# Patient Record
Sex: MALE | Race: WHITE | Employment: OTHER | URBAN - METROPOLITAN AREA
[De-identification: names, ages, dates, MRNs, and addresses within clinical notes are randomized per-mention and may not be internally consistent; named-entity substitution may affect disease eponyms.]

---

## 2017-01-16 ENCOUNTER — ALLSCRIPTS OFFICE VISIT (OUTPATIENT)
Dept: OTHER | Facility: OTHER | Age: 72
End: 2017-01-16

## 2018-01-12 VITALS
DIASTOLIC BLOOD PRESSURE: 92 MMHG | WEIGHT: 177 LBS | SYSTOLIC BLOOD PRESSURE: 148 MMHG | HEART RATE: 88 BPM | RESPIRATION RATE: 16 BRPM | HEIGHT: 66 IN | BODY MASS INDEX: 28.45 KG/M2

## 2018-01-17 NOTE — PROGRESS NOTES
Chief Complaint  1  Placement of a thoracic spinal cord stimulator paddle electrode via T10-11 laminectomy  2  Placement of a left buttock implantable pulse generator and  3  Electronic analysis complex programming spinal cord stimulator system postoperative period approximately 1 hour on 11/29/16 by Dr Yasmin Contreras  History of Present Illness  HPI: The patient presents today for his two week incision check and staple removal accompanied by his wife  He states he is still having low back pain which is the same as before surgery  He is unsure how to use his program and states he is slightly confused  Iliana malin [address]  was available and offered the patient assistance  [facility] Practices Required Assessment:   Pain Assessment   the patient states they do not have pain  Abuse And Domestic Violence Screen    Yes, the patient is safe at home  The patient states no one is hurting them  Depression And Suicide Screen  No, the patient has not had thoughts of hurting themself  No, the patient has not felt depressed in the past 7 days  Readiness To Learn: Receptive  Barriers To Learning: none  Preferred Learning: verbal   Education Completed: medications, further treatment/follow-up and treatment/procedure   Teaching Method: verbal   Person Taught: patient   Evaluation Of Learning: verbalized/demonstrated understanding      Active Problems    1  Chronic pain syndrome (338 4) (G89 4)   2  Lumbar degenerative disc disease (722 52) (M51 36)   3  Lumbar stenosis with neurogenic claudication (724 03) (M48 06)   4  Post laminectomy syndrome (722 80) (M96 1)   5  Status post insertion of spinal cord stimulator (V45 89) (Z98 890)    Current Meds   1  Adult Aspirin EC Low Strength 81 MG Oral Tablet Delayed Release; TAKE 1 TABLET   DAILY; Therapy: (Recorded:[id number]) to Recorded   2  ALPRAZolam 0 25 MG Oral Tablet; TAKE 1 TABLET DAILY AS NEEDED; Therapy: (Barbara Sierra) to Recorded   3  Atorvastatin Calcium 10 MG Oral Tablet; TAKE 1 TABLET AT BEDTIME; Therapy: (Maura Hendricks) to Recorded   4  Benefiber Drink Mix POWD; MIX PACKET Every morning; Therapy: (Maura Hendricks) to Recorded   5  Celecoxib 200 MG Oral Capsule; TAKE 1 CAPSULE DAILY; Therapy: (Recorded:[id number]) to Recorded   6  Cholecalciferol 5000 UNIT TABS; Take 1 tablet daily; Therapy: (Maura Hendricks) to Recorded   7  Cyanocobalamin 1000 MCG TABS; TAKE 1 TABLET DAILY AS DIRECTED; Therapy: (Maura Hendricks) to Recorded   8  HydroCHLOROthiazide 50 MG Oral Tablet; take 1 tablet daily in the morning; Therapy: (Maura Hendricks) to Recorded   9  Hydrocodone-Acetaminophen  MG Oral Tablet; TAKE 1 TABLET EVERY 6   HOURS AS NEEDED FOR PAIN;   Therapy: (Recorded:[id number]) to Recorded   10  Loperamide HCl 2 MG TABS; TAKE 2 TABLETS AT BEDTIME; Therapy: (Maura Hendricks) to Recorded   11  Probiotic Complex Acidophilus Oral Capsule; Therapy: (Recorded:[id number]) to Recorded    Allergies    1  Penicillins   2  ramipril    Vitals  Signs    Temperature: 97 7 F, Tympanic  Heart Rate: 74, L Radial  Pulse Quality: Normal, L Radial  Respiration Quality: Normal  Respiration: 16  Systolic: 718, LUE, Sitting  Diastolic: 80, LUE, Sitting  Height: 5 ft 6 in  Weight: 177 lb   BMI Calculated: 28 57  BSA Calculated: 1 9    Procedure    Wound Exam: well healed with no sign of infection  There was mild erythema around the wound edges  Procedure Note: 7 left buttock and 11 thoracic spine staples were removed  Patient Status:  the patient tolerated the procedure well  Complications:  there were no complications  Assessment    1  Postop check (V67 00) (Z09)   2   Encounter for staple removal (V58 32) (Z48 02)    Plan  Unlinked    · Adult Aspirin EC Low Strength 81 MG Oral Tablet Delayed Release; TAKE 1  TABLET DAILY   · Celecoxib 200 MG Oral Capsule; TAKE 1 CAPSULE DAILY    Discussion/Summary    Proper incision care and s/s of infection were reviewed with the patient  He was asked to contact the office with any complaints of chills, fever >101, edema, erythema, drainage or gaping of incision  He was advised to wash the incision with mild soap and water, avoiding lotions and creams  He will follow up with Dr Natalie Richards in four weeks  Future Appointments    Date/Time Provider Specialty Site   01/16/2017 09:30 AM TODD [facility]   Neurosurgery [address]     Signatures   Electronically signed by : Alicia Hernandez RN; Dec 13 2016 10:04AM EST                       (Author)    Electronically signed by : TODD Reddy Ace ; Dec 17 2016 12:12PM EST                       (Author)

## 2021-10-06 NOTE — MISCELLANEOUS
Message  The patient was contacted and preop instructions were given  He verbalized understanding and will contact the office with any additional questions or concerns  Active Problems    1  Chronic pain syndrome (338 4) (G89 4)   2  Lumbar degenerative disc disease (722 52) (M51 36)   3  Lumbar stenosis with neurogenic claudication (724 03) (M48 06)   4  Post laminectomy syndrome (722 80) (M96 1)   5  Status post insertion of spinal cord stimulator (Z62 89) (Z98 890)    Current Meds   1  Adult Aspirin EC Low Strength 81 MG Oral Tablet Delayed Release; TAKE 1 TABLET   DAILY; Therapy: (720 2645) to Recorded   2  ALPRAZolam 0 25 MG Oral Tablet; TAKE 1 TABLET DAILY AS NEEDED; Therapy: (763 4109) to Recorded   3  Atorvastatin Calcium 10 MG Oral Tablet; TAKE 1 TABLET AT BEDTIME; Therapy: (696 8598) to Recorded   4  Benefiber Drink Mix POWD; MIX PACKET Every morning; Therapy: (332 8224) to Recorded   5  Celecoxib 200 MG Oral Capsule; TAKE 1 CAPSULE DAILY; Therapy: (233 0155) to Recorded   6  Cholecalciferol 5000 UNIT TABS; Take 1 tablet daily; Therapy: (405 3175) to Recorded   7  Clindamycin HCl - 300 MG Oral Capsule; TAKE 2 CAPSULE Every 8 hours Please start   after you arrive home from surgery; Therapy: [id number] to (Idalmis Craft)  Requested for: [id number]; Last   Rx:28Nov2016; Status: ACTIVE - Retrospective By Protocol Authorization Ordered   8  Cyanocobalamin 1000 MCG TABS; TAKE 1 TABLET DAILY AS DIRECTED; Therapy: (294 0318) to Recorded   9  HydroCHLOROthiazide 50 MG Oral Tablet; take 1 tablet daily in the morning; Therapy: (Recorded:23May2016) to Recorded   10  Hydrocodone-Acetaminophen  MG Oral Tablet; TAKE 1 TABLET EVERY 6    HOURS AS NEEDED FOR PAIN;    Therapy: (Recorded:23May2016) to Recorded   11  Loperamide HCl 2 MG TABS; TAKE 2 TABLETS AT BEDTIME; Therapy: (965 3477) to Recorded   12  Oxycodone-Acetaminophen 5-325 MG Oral Tablet; TAKE 1 TO 2 TABLETS EVERY 4    HOURS AS NEEDED FOR PAIN;    Therapy: [id number] to (Evaluate:10Vwp6986); Last Rx:28Nov2016; Status: ACTIVE -    Retrospective By Protocol Authorization Ordered   13  Probiotic Complex Acidophilus Oral Capsule; Therapy: (Recorded:[id number]) to Recorded    Allergies    1   Penicillins   2  ramipril    Signatures   Electronically signed by : Ginette Munoz RN; Nov 28 2016  9:28AM EST                       (Author) [FreeTextEntry1] : labs \par ekg\par meds renewed\par \par watch bp